# Patient Record
Sex: MALE | Race: BLACK OR AFRICAN AMERICAN | NOT HISPANIC OR LATINO | URBAN - METROPOLITAN AREA
[De-identification: names, ages, dates, MRNs, and addresses within clinical notes are randomized per-mention and may not be internally consistent; named-entity substitution may affect disease eponyms.]

---
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
## 2023-03-03 ENCOUNTER — HOSPITAL ENCOUNTER (OUTPATIENT)
Dept: RADIOLOGY | Facility: HOSPITAL | Age: 18
Discharge: HOME | End: 2023-03-03
Attending: NURSE PRACTITIONER
Payer: MEDICAID

## 2023-03-03 ENCOUNTER — TELEPHONE (OUTPATIENT)
Dept: URGENT CARE | Facility: CLINIC | Age: 18
End: 2023-03-03

## 2023-03-03 ENCOUNTER — OFFICE VISIT (OUTPATIENT)
Dept: URGENT CARE | Facility: CLINIC | Age: 18
End: 2023-03-03
Payer: MEDICAID

## 2023-03-03 VITALS
SYSTOLIC BLOOD PRESSURE: 118 MMHG | RESPIRATION RATE: 16 BRPM | OXYGEN SATURATION: 100 % | DIASTOLIC BLOOD PRESSURE: 74 MMHG | HEIGHT: 69 IN | TEMPERATURE: 98 F | WEIGHT: 156 LBS | HEART RATE: 95 BPM | BODY MASS INDEX: 23.11 KG/M2

## 2023-03-03 DIAGNOSIS — R06.2 WHEEZING: ICD-10-CM

## 2023-03-03 DIAGNOSIS — R09.89 SYMPTOMS OF UPPER RESPIRATORY INFECTION (URI): Primary | ICD-10-CM

## 2023-03-03 LAB
CTP QC/QA: YES
FLUAV AG NPH QL: NEGATIVE
FLUBV AG NPH QL: NEGATIVE
MOLECULAR STREP A: NEGATIVE
SARS-COV-2 RDRP RESP QL NAA+PROBE: NEGATIVE

## 2023-03-03 PROCEDURE — 87804 INFLUENZA ASSAY W/OPTIC: CPT | Mod: 59,PBBFAC | Performed by: NURSE PRACTITIONER

## 2023-03-03 PROCEDURE — 99215 OFFICE O/P EST HI 40 MIN: CPT | Mod: PBBFAC,25 | Performed by: NURSE PRACTITIONER

## 2023-03-03 PROCEDURE — 87635 SARS-COV-2 COVID-19 AMP PRB: CPT | Mod: PBBFAC | Performed by: NURSE PRACTITIONER

## 2023-03-03 PROCEDURE — 99214 OFFICE O/P EST MOD 30 MIN: CPT | Mod: S$PBB,,, | Performed by: NURSE PRACTITIONER

## 2023-03-03 PROCEDURE — 99214 PR OFFICE/OUTPT VISIT, EST, LEVL IV, 30-39 MIN: ICD-10-PCS | Mod: S$PBB,,, | Performed by: NURSE PRACTITIONER

## 2023-03-03 PROCEDURE — 71046 X-RAY EXAM CHEST 2 VIEWS: CPT | Mod: TC

## 2023-03-03 PROCEDURE — 87651 STREP A DNA AMP PROBE: CPT | Mod: PBBFAC | Performed by: NURSE PRACTITIONER

## 2023-03-03 RX ORDER — AZITHROMYCIN 250 MG/1
TABLET, FILM COATED ORAL
Qty: 6 TABLET | Refills: 0 | Status: SHIPPED | OUTPATIENT
Start: 2023-03-03 | End: 2023-03-08

## 2023-03-03 RX ORDER — BENZONATATE 200 MG/1
200 CAPSULE ORAL 3 TIMES DAILY PRN
Qty: 30 CAPSULE | Refills: 0 | Status: SHIPPED | OUTPATIENT
Start: 2023-03-03 | End: 2023-03-13

## 2023-03-03 RX ORDER — ALBUTEROL SULFATE 90 UG/1
2 AEROSOL, METERED RESPIRATORY (INHALATION) EVERY 6 HOURS PRN
Qty: 18 G | Refills: 0 | Status: SHIPPED | OUTPATIENT
Start: 2023-03-03 | End: 2024-03-02

## 2023-03-03 RX ORDER — DEXAMETHASONE SODIUM PHOSPHATE 4 MG/ML
8 INJECTION, SOLUTION INTRA-ARTICULAR; INTRALESIONAL; INTRAMUSCULAR; INTRAVENOUS; SOFT TISSUE ONCE
Status: SHIPPED | OUTPATIENT
Start: 2023-03-03

## 2023-03-03 NOTE — PROGRESS NOTES
"Subjective:       Patient ID: Simone Evans is a 18 y.o. male.    Vitals:  height is 5' 9" (1.753 m) and weight is 70.8 kg (156 lb). His oral temperature is 98.1 °F (36.7 °C). His blood pressure is 118/74 and his pulse is 95. His respiration is 16 and oxygen saturation is 100%.     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 Brother dx with PNA 2 weeks ago. Patient works at [employer]. No fevers. Some coughing at night. No hx asthma, non smoker.   ROS    Objective:      Physical Exam   Constitutional: He is oriented to person, place, and time. He does not appear ill. normal  HENT:   Nose: Rhinorrhea and congestion present.   Eyes: Conjunctivae are normal.   Cardiovascular: Normal rate, regular rhythm and normal heart sounds.   Pulmonary/Chest: Effort normal. He has wheezes. He has rhonchi.   Abdominal: Normal appearance.   Lymphadenopathy:     He has no cervical adenopathy.   Neurological: He is alert and oriented to person, place, and time.   Skin: Skin is warm and dry.   Psychiatric: His behavior is normal. Mood, judgment and thought content normal.   Vitals reviewed.      Assessment:       1. Symptoms of upper respiratory infection (URI)    2. Wheezing          Results for orders placed or performed in visit on 03/03/23   POCT Influenza A/B Rapid Antigen   Result Value Ref Range    Rapid Influenza A Ag Negative Negative    Rapid Influenza B Ag Negative Negative     Acceptable Yes    POCT COVID-19 Rapid Screening   Result Value Ref Range    POC Rapid COVID Negative Negative     Acceptable Yes    POCT Strep A, Molecular   Result Value Ref Range    Molecular Strep A, POC Negative Negative     Acceptable Yes          Plan:         Symptoms of upper respiratory infection (URI)  -     POCT Influenza A/B Rapid Antigen  -     POCT COVID-19 " Rapid Screening  -     POCT Strep A, Molecular  -     XR CHEST PA AND LATERAL    Wheezing  -     XR CHEST PA AND LATERAL    Other orders  -     dexAMETHasone injection 8 mg  -     azithromycin (Z-MEMO) 250 MG tablet; Take 2 tablets by mouth on day 1; Take 1 tablet by mouth on days 2-5  Dispense: 6 tablet; Refill: 0  -     albuterol (PROVENTIL HFA) 90 mcg/actuation inhaler; Inhale 2 puffs into the lungs every 6 (six) hours as needed for Wheezing. Rescue  Dispense: 18 g; Refill: 0  -     benzonatate (TESSALON) 200 MG capsule; Take 1 capsule (200 mg total) by mouth 3 (three) times daily as needed.  Dispense: 30 capsule; Refill: 0         If you start to have high fevers, 102.5+, that are not going away despite taking medications for them, go to the ER.  If you become dizzy, faint, very short of breath, unable to get around the house easily, go to ER.    Start Rx medications today.    Take 10 deep breaths,, every hour, while sitting down, while you are awake.   Every day, for the next 7 days.

## 2023-03-03 NOTE — LETTER
March 3, 2023      [facility]  [address] [address]  Phone: [phone]       Patient: Simone Evans   YOB: 2005  Date of Visit: 03/03/2023    To Whom It May Concern:    Elliott Evans  was at [facility] on 03/03/2023. The patient may return to work/school on 03/05/2023 with no restrictions. If you have any questions or concerns, or if I can be of further assistance, please do not hesitate to contact me.    Sincerely,      Henny Sanchez NP

## 2023-03-03 NOTE — TELEPHONE ENCOUNTER
----- Message from PAU Mejia sent at 3/3/2023 11:51 AM CST -----  No pneumonia.   Take meds as Rx'ed.    The patient needed a steroid shot. He left without receiving it.  The nurses tried to call him, non working # and no emergency contact listed.

## 2023-03-03 NOTE — PATIENT INSTRUCTIONS
If you start to have high fevers, 102.5+, that are not going away despite taking medications for them, go to the ER.  If you become dizzy, faint, very short of breath, unable to get around the house easily, go to ER.    Start Rx medications today.    Take 10 deep breaths,, every hour, while sitting down, while you are awake.   Every day, for the next 7 days.

## 2023-03-03 NOTE — NURSING
Patient left before Dexamethasone could be administered. Attempted to reach patient by calling all listed phone numbers, all are non working numbers. There is no emergency contact set up in patient's chart.

## 2023-03-07 ENCOUNTER — OFFICE VISIT (OUTPATIENT)
Dept: URGENT CARE | Facility: CLINIC | Age: 18
End: 2023-03-07
Payer: MEDICAID

## 2023-03-07 VITALS
WEIGHT: 152.5 LBS | TEMPERATURE: 98 F | SYSTOLIC BLOOD PRESSURE: 119 MMHG | OXYGEN SATURATION: 99 % | HEART RATE: 57 BPM | RESPIRATION RATE: 18 BRPM | DIASTOLIC BLOOD PRESSURE: 68 MMHG | HEIGHT: 69 IN | BODY MASS INDEX: 22.59 KG/M2

## 2023-03-07 DIAGNOSIS — M54.50 LOW BACK PAIN, UNSPECIFIED BACK PAIN LATERALITY, UNSPECIFIED CHRONICITY, UNSPECIFIED WHETHER SCIATICA PRESENT: ICD-10-CM

## 2023-03-07 DIAGNOSIS — R11.0 NAUSEA: Primary | ICD-10-CM

## 2023-03-07 DIAGNOSIS — R05.1 ACUTE COUGH: ICD-10-CM

## 2023-03-07 DIAGNOSIS — Z76.89 REFERRAL OF PATIENT: ICD-10-CM

## 2023-03-07 LAB
BILIRUB UR QL STRIP: NEGATIVE
GLUCOSE UR QL STRIP: NEGATIVE
KETONES UR QL STRIP: NEGATIVE
LEUKOCYTE ESTERASE UR QL STRIP: NEGATIVE
PH, POC UA: 7.5
POC BLOOD, URINE: NEGATIVE
POC NITRATES, URINE: NEGATIVE
PROT UR QL STRIP: NEGATIVE
SP GR UR STRIP: 1.02 (ref 1–1.03)
UROBILINOGEN UR STRIP-ACNC: NORMAL (ref 0.3–2.2)

## 2023-03-07 PROCEDURE — 99214 OFFICE O/P EST MOD 30 MIN: CPT | Mod: PBBFAC

## 2023-03-07 PROCEDURE — 99213 PR OFFICE/OUTPT VISIT, EST, LEVL III, 20-29 MIN: ICD-10-PCS | Mod: S$PBB,,,

## 2023-03-07 PROCEDURE — 81003 URINALYSIS AUTO W/O SCOPE: CPT | Mod: PBBFAC

## 2023-03-07 PROCEDURE — 99213 OFFICE O/P EST LOW 20 MIN: CPT | Mod: S$PBB,,,

## 2023-03-07 RX ORDER — DICLOFENAC SODIUM 75 MG/1
75 TABLET, DELAYED RELEASE ORAL 2 TIMES DAILY
Qty: 30 TABLET | Refills: 0 | Status: SHIPPED | OUTPATIENT
Start: 2023-03-07

## 2023-03-07 RX ORDER — ONDANSETRON 4 MG/1
4 TABLET, ORALLY DISINTEGRATING ORAL EVERY 8 HOURS PRN
Qty: 10 TABLET | Refills: 0 | Status: SHIPPED | OUTPATIENT
Start: 2023-03-07

## 2023-03-07 RX ORDER — PROMETHAZINE HYDROCHLORIDE AND DEXTROMETHORPHAN HYDROBROMIDE 6.25; 15 MG/5ML; MG/5ML
5 SYRUP ORAL EVERY 6 HOURS PRN
Qty: 118 ML | Refills: 0 | Status: SHIPPED | OUTPATIENT
Start: 2023-03-07 | End: 2023-03-17

## 2023-03-07 NOTE — PROGRESS NOTES
"Subjective:       Patient ID: Simone Evans is a 18 y.o. male.    Vitals:  height is 5' 9" (1.753 m) and weight is 69.2 kg (152 lb 8 oz). His oral temperature is 97.9 °F (36.6 °C). His blood pressure is 119/68 and his pulse is 57 (abnormal). His respiration is 18 and oxygen saturation is 99%.     Chief Complaint: Nausea (xOne week), Emesis (xOne week), Back Pain (C/o pain off and on. Hurt during sports and now states sometimes picks up heavy items at work. ), Cough (xOne week), Nasal Congestion (xOne week), and Referral (Needs PCP referral )    PT states N/V, cough, nasal congestion for a week. Was seen 4 days ago, negative for covid/flu/strep. Was given medication, pt states taking but continued cough.  Pt also states chronic low back pain from an old basketball injury.    Nausea  Associated symptoms include congestion, coughing, nausea and vomiting.   Emesis   Associated symptoms include coughing.   Back Pain    Cough      Constitution: Negative.   HENT:  Positive for congestion.    Neck: neck negative.   Cardiovascular: Negative.    Eyes: Negative.    Respiratory:  Positive for cough.    Gastrointestinal:  Positive for nausea and vomiting.   Genitourinary: Negative.    Musculoskeletal:  Positive for back pain.   Skin: Negative.    Neurological: Negative.      Objective:      Physical Exam   Constitutional: He is oriented to person, place, and time. normal  HENT:   Head: Normocephalic.   Ears:   Right Ear: Tympanic membrane, external ear and ear canal normal.   Left Ear: Tympanic membrane, external ear and ear canal normal.   Nose: Nose normal.   Mouth/Throat: Uvula is midline, oropharynx is clear and moist and mucous membranes are normal. Mucous membranes are moist. Oropharynx is clear.   Eyes: Pupils are equal, round, and reactive to light.   Neck: Neck supple.   Cardiovascular: Normal rate, regular rhythm, normal heart sounds and normal pulses.   Pulmonary/Chest: Effort normal. He has wheezes.   Abdominal: Normal " appearance. Soft.   Musculoskeletal: Normal range of motion.         General: Normal range of motion.   Neurological: He is alert and oriented to person, place, and time.   Skin: Skin is warm and dry.   Vitals reviewed.      Results for orders placed or performed in visit on 03/07/23   POCT Urinalysis, Dipstick, Automated, W/O Scope   Result Value Ref Range    POC Blood, Urine Negative Negative    POC Bilirubin, Urine Negative Negative    POC Urobilinogen, Urine 1.0e.u./dl 0.3 - 2.2    POC Ketones, Urine Negative Negative    POC Protein, Urine Negative Negative    POC Nitrates, Urine Negative Negative    POC Glucose, Urine Negative Negative    pH, UA 7.5     POC Specific Gravity, Urine 1.020 1.003 - 1.029    POC Leukocytes, Urine Negative Negative       Assessment:       1. Nausea    2. Low back pain, unspecified back pain laterality, unspecified chronicity, unspecified whether sciatica present    3. Referral of patient    4. Acute cough            Plan:         Nausea  -     ondansetron (ZOFRAN-ODT) 4 MG TbDL; Take 1 tablet (4 mg total) by mouth every 8 (eight) hours as needed (nausea).  Dispense: 10 tablet; Refill: 0    Low back pain, unspecified back pain laterality, unspecified chronicity, unspecified whether sciatica present  -     POCT Urinalysis, Dipstick, Automated, W/O Scope  -     diclofenac (VOLTAREN) 75 MG EC tablet; Take 1 tablet (75 mg total) by mouth 2 (two) times daily.  Dispense: 30 tablet; Refill: 0    Referral of patient  -     Ambulatory referral/consult to Family Practice    Acute cough  -     promethazine-dextromethorphan (PROMETHAZINE-DM) 6.25-15 mg/5 mL Syrp; Take 5 mLs by mouth every 6 (six) hours as needed.  Dispense: 118 mL; Refill: 0    Continue on albuterol for wheezing.     Voltaren and low back stretches for chronic back pain.    Proper body mechanics when lifting.    Referral placed for PCP.      Please drink plenty of fluids.  Please get plenty of rest.    Take over the counter  Tylenol (Acetaminophen) and/or Motrin (Ibuprofen) as directed for control of pain and/or fever.  Please follow up with your primary care doctor.     ER precautions given, patient verbalized understanding.     Please see provided patient education for guidance.    Follow up with PCP or return to clinic if symptoms worsen or do not improve.

## 2023-03-07 NOTE — LETTER
March 7, 2023      [facility]  [address] [facility] [address]  Phone: [phone]       Patient: Simone Evans   YOB: 2005  Date of Visit: 03/07/2023    To Whom It May Concern:    Elliott Evans  was at [facility] on 03/07/2023. The patient may return to work/school on 3/9/23. If you have any questions or concerns, or if I can be of further assistance, please do not hesitate to contact me.    Sincerely,    UDAY Mendez NP